# Patient Record
Sex: FEMALE | Race: WHITE | Employment: UNEMPLOYED | URBAN - METROPOLITAN AREA
[De-identification: names, ages, dates, MRNs, and addresses within clinical notes are randomized per-mention and may not be internally consistent; named-entity substitution may affect disease eponyms.]

---
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
## 2021-05-07 ENCOUNTER — HOSPITAL ENCOUNTER (OUTPATIENT)
Dept: CARDIAC CATH/INVASIVE PROCEDURES | Age: 21
Discharge: HOME | End: 2021-05-07
Payer: COMMERCIAL

## 2021-05-07 PROCEDURE — 93660 TILT TABLE EVALUATION: CPT | Performed by: INTERNAL MEDICINE

## 2021-05-07 NOTE — PROCEDURES
[facility]    Tilt Table Test Report                       Today's Date:  5/7/2021  Patient Name:  Beka Washingotn  Date of admission: 5/7/2021  8:12 AM  Patient's age:  24 y.o., 2000  Admission Dx:  Dizziness [R42]    Requesting Physician: No admitting provider for patient encounter. Procedures performed: Tilt table testing    Indication of the procedure:  Beka Washington is a 24 y.o. female who presented with near syncope. Details of procedure: The procedure, the risks, benefits and alternatives of the procedure were explained to the patient. All questions were answered. Patient verbalized understanding and informed consent was obtained. The patient was brought to the electrophysiology lab in a fasting nonsedated state. Peripheral IV access was obtained and the patient was put on the tilt table. Initial vital signs at baseline:    BP: 114/74 mmHg. HR: 54 bpm.    Then the tilt table was raised to 70 degree. Immediately upon raising the table the vitals were:     BP: 114/74 mmHg. HR: 65 bpm.    After 20 minutes, the patient received 0.4 mg NTG sublingual. 10 minutes after NTG, the patient felt symptoms of dizziness & chest pressure , and the recorded vital signs were:    BP: 121/64 mmHg. HR: 76 bpm.      At this time, the patient experienced dizziness but no syncope. · The patient did not experience a cardioinhibitory reaction with asystole. · Patient did not  experience syncope during the tilt table test.     At the end of procedure:  BP: 112/64 mmHg. HR: 91 bpm.      The patient tolerated the procedure well and there were no complications. Conclusion:    Negative for neurocardiogenic syncope    Recommendations:    · Recommended to avoid dehydration, paying close attention to any prodromal symptoms and how to avert syncope by lying down and elevating legs.    · Patient also to have compression stocking and encouraged to use them when working long hours and need to stand for a long time. · Specific techniques to decrease the pooling of blood in legs such as foot exercises, or tensing leg muscles when standing and increasing salt in diet were given. · Plan for patient to go to [facility] clinic for Holter monitor. Will follow up on results in clinic. · Medication (e.g. fluorinef or others as instructed) if symptoms recurs despite these measurements, then medication can be used. Discussed with patient and Nurse.       Sara Rivera MD  Fellow, [id number] [facility]

## 2023-10-16 LAB
ALBUMIN/GLOBULIN RATIO: 1.5 G/DL
ALBUMIN: 4.3 G/DL (ref 3.5–5)
ALP BLD-CCNC: 68 UNITS/L (ref 38–126)
ALT SERPL-CCNC: 14 UNITS/L (ref 4–35)
ANION GAP SERPL CALCULATED.3IONS-SCNC: 10.7 MMOL/L (ref 12–16)
AST SERPL-CCNC: 21 UNITS/L (ref 14–36)
BASOPHILS %: 1.01 (ref 0–3)
BASOPHILS ABSOLUTE: 0.07 (ref 0–0.3)
BILIRUB SERPL-MCNC: 0.4 MG/DL (ref 0.2–1.3)
BUN BLDV-MCNC: 16 MG/DL (ref 7–17)
C-REACTIVE PROTEIN: 0.6 MG/DL (ref 0–1)
CALCIUM SERPL-MCNC: 9.2 MG/DL (ref 8.4–10.2)
CHLORIDE BLD-SCNC: 105 MMOL/L (ref 98–120)
CO2: 24 MMOL/L (ref 22–31)
CREAT SERPL-MCNC: 0.7 MG/DL (ref 0.5–1)
CREATINE KINASE: 78 UNITS/L (ref 25–170)
EOSINOPHILS %: 1.26 (ref 0–10)
EOSINOPHILS ABSOLUTE: 0.08 (ref 0–1.1)
GFR CALCULATED: > 60
GLOBULIN: 2.8 G/DL
GLUCOSE: 83 MG/DL (ref 65–105)
HCT VFR BLD CALC: 43.6 % (ref 37–47)
HEMOGLOBIN: 14 (ref 12–16)
LYMPHOCYTE %: 27.84 (ref 20–51.1)
LYMPHOCYTES ABSOLUTE: 1.81 (ref 1–5.5)
MCH RBC QN AUTO: 29.3 PG (ref 28.5–32.5)
MCHC RBC AUTO-ENTMCNC: 32 G/DL (ref 32–37)
MCV RBC AUTO: 91.7 FL (ref 80–94)
MONOCYTES %: 8.81 (ref 1.7–9.3)
MONOCYTES ABSOLUTE: 0.57 (ref 0.1–1)
NEUTROPHILS %: 61.08 (ref 42.2–75.2)
NEUTROPHILS ABSOLUTE: 3.97 (ref 2–8.1)
PDW BLD-RTO: 12.5 % (ref 8.5–15.5)
PLATELET # BLD: 281.5 THOU/MM3 (ref 130–400)
POTASSIUM SERPL-SCNC: 3.8 MMOL/L (ref 3.6–5)
RBC: 4.76 M/UL (ref 4.2–5.4)
SEDIMENTATION RATE, ERYTHROCYTE: 5 MM/HR (ref 0–20)
SODIUM BLD-SCNC: 140 MMOL/L (ref 135–145)
TOTAL PROTEIN, SERUM: 7.1 G/DL (ref 6.3–8.2)
TSH SERPL DL<=0.05 MIU/L-ACNC: 2.09 MIU/ML (ref 0.49–4.67)
WBC: 6.5 THOU/ML3 (ref 4.8–10.8)

## 2023-10-17 LAB — PROLACTIN: 15.3 NG/ML (ref 4.79–23.3)

## 2023-10-23 LAB — GYNECOLOGY CYTOLOGY REPORT: NORMAL

## 2023-11-22 LAB — PROGESTERONE LEVEL: 16.8 NG/ML

## 2023-11-30 LAB — HCG QUANTITATIVE: 62.8 MUNIT/ML (ref 0–5)

## 2023-12-04 LAB — HCG QUANTITATIVE: 695.9 MUNIT/ML (ref 0–5)

## 2023-12-06 LAB
ALBUMIN/GLOBULIN RATIO: 1.5 G/DL
ALBUMIN: 4.2 G/DL (ref 3.5–5)
ALP BLD-CCNC: 77 UNITS/L (ref 38–126)
ALT SERPL-CCNC: 19 UNITS/L (ref 4–35)
ANION GAP SERPL CALCULATED.3IONS-SCNC: 10.1 MMOL/L (ref 12–16)
AST SERPL-CCNC: 27 UNITS/L (ref 14–36)
BASOPHILS %: 0.76 (ref 0–3)
BASOPHILS ABSOLUTE: 0.05 (ref 0–0.3)
BILIRUB SERPL-MCNC: 0.4 MG/DL (ref 0.2–1.3)
BUN BLDV-MCNC: 18 MG/DL (ref 7–17)
CALCIUM SERPL-MCNC: 9.2 MG/DL (ref 8.4–10.2)
CHLORIDE BLD-SCNC: 105 MMOL/L (ref 98–120)
CO2: 24 MMOL/L (ref 22–31)
CREAT SERPL-MCNC: 0.7 MG/DL (ref 0.5–1)
EOSINOPHILS %: 1.59 (ref 0–10)
EOSINOPHILS ABSOLUTE: 0.1 (ref 0–1.1)
GFR CALCULATED: > 60
GLOBULIN: 2.7 G/DL
GLUCOSE: 62 MG/DL (ref 65–105)
HCT VFR BLD CALC: 43.4 % (ref 37–47)
HEMOGLOBIN: 13.5 (ref 12–16)
LYMPHOCYTE %: 30.02 (ref 20–51.1)
LYMPHOCYTES ABSOLUTE: 1.91 (ref 1–5.5)
MCH RBC QN AUTO: 28.5 PG (ref 28.5–32.5)
MCHC RBC AUTO-ENTMCNC: 31.1 G/DL (ref 32–37)
MCV RBC AUTO: 91.6 FL (ref 80–94)
MONOCYTES %: 12.18 (ref 1.7–9.3)
MONOCYTES ABSOLUTE: 0.78 (ref 0.1–1)
NEUTROPHILS %: 55.45 (ref 42.2–75.2)
NEUTROPHILS ABSOLUTE: 3.54 (ref 2–8.1)
PDW BLD-RTO: 12.3 % (ref 8.5–15.5)
PLATELET # BLD: 270.9 THOU/MM3 (ref 130–400)
POTASSIUM SERPL-SCNC: 4.1 MMOL/L (ref 3.6–5)
RBC: 4.73 M/UL (ref 4.2–5.4)
SODIUM BLD-SCNC: 138 MMOL/L (ref 135–145)
TOTAL PROTEIN, SERUM: 6.9 G/DL (ref 6.3–8.2)
TSH REFLEX FT4: 4.28 MIU/ML (ref 0.49–4.67)
WBC: 6.4 THOU/ML3 (ref 4.8–10.8)

## 2024-03-18 LAB
AFP MOM: 0.55
AFP SPECIMEN: NORMAL
CURRENTLY SMOKING: NORMAL
DATE OF LMP: NORMAL
DATING: NORMAL
DETERMINED BY: NORMAL
DIABETIC: NORMAL
DONOR EGG?: NEGATIVE
DUE DATE: NORMAL
ESTIMATED DUE DATE: NORMAL
FAMILY HISTORY: NORMAL
GESTATIONAL AGE: NORMAL
IN VITRO FERTILIZATION: NORMAL
INSULIN DEP. DIABETIC: NO
MATERNAL AGE AT EDD: 22.7
MATERNAL DOB: NORMAL
MATERNAL SCREEN INTERPRETATION: NORMAL
MATERNAL WEIGHT: NORMAL
MONOCHORIONIC TWINS: NORMAL
NUMBER OF FETUSES: NORMAL
PATIENT WEIGHT UNITS: NORMAL
PT AFP: 19
RACE (MATERNAL): NORMAL
RACE/ETHNICITY: NORMAL
REPEAT SPECIMEN?: NORMAL
SMOKER?: NO
VALPROIC/CARBAMAZEP: NORMAL

## 2024-04-23 LAB
BASOPHILS %: 0.89 (ref 0–3)
BASOPHILS ABSOLUTE: 0.06 (ref 0–0.3)
EOSINOPHILS %: 1.29 (ref 0–10)
EOSINOPHILS ABSOLUTE: 0.09 (ref 0–1.1)
GLUCOSE TOLERANCE TEST 1 HOUR: NORMAL
HCT VFR BLD CALC: 36.1 % (ref 37–47)
HEMOGLOBIN: 11.7 (ref 12–16)
LYMPHOCYTE %: 22.73 (ref 20–51.1)
LYMPHOCYTES ABSOLUTE: 1.56 (ref 1–5.5)
MCH RBC QN AUTO: 29.7 PG (ref 28.5–32.5)
MCHC RBC AUTO-ENTMCNC: 32.4 G/DL (ref 32–37)
MCV RBC AUTO: 91.8 FL (ref 80–94)
MONOCYTES %: 8.46 (ref 1.7–9.3)
MONOCYTES ABSOLUTE: 0.58 (ref 0.1–1)
NEUTROPHILS ABSOLUTE: 4.57 (ref 2–8.1)
NEUTROPHILS RELATIVE PERCENT: 66.63 (ref 42.2–75.2)
PDW BLD-RTO: 12.5 % (ref 8.5–15.5)
PLATELET # BLD: 242.5 THOU/MM3 (ref 130–400)
RBC: 3.93 M/UL (ref 4.2–5.4)
WBC: 6.9 THOU/ML3 (ref 4.8–10.8)

## 2025-06-23 LAB — PREGNANCY, URINE: NEGATIVE
